# Patient Record
Sex: FEMALE | Race: WHITE | NOT HISPANIC OR LATINO | Employment: OTHER | URBAN - METROPOLITAN AREA
[De-identification: names, ages, dates, MRNs, and addresses within clinical notes are randomized per-mention and may not be internally consistent; named-entity substitution may affect disease eponyms.]

---

## 2023-10-24 ENCOUNTER — SPECIALTY PHARMACY (OUTPATIENT)
Dept: PHARMACY | Facility: CLINIC | Age: 63
End: 2023-10-24

## 2023-10-24 ENCOUNTER — PHARMACY VISIT (OUTPATIENT)
Dept: PHARMACY | Facility: CLINIC | Age: 63
End: 2023-10-24
Payer: MEDICARE

## 2023-10-24 PROCEDURE — RXMED WILLOW AMBULATORY MEDICATION CHARGE

## 2023-11-20 ENCOUNTER — TELEPHONE (OUTPATIENT)
Dept: RHEUMATOLOGY | Facility: CLINIC | Age: 63
End: 2023-11-20
Payer: MEDICARE

## 2023-11-20 DIAGNOSIS — M06.9 RHEUMATOID ARTHRITIS, INVOLVING UNSPECIFIED SITE, UNSPECIFIED WHETHER RHEUMATOID FACTOR PRESENT (MULTI): Primary | ICD-10-CM

## 2023-11-21 ENCOUNTER — SPECIALTY PHARMACY (OUTPATIENT)
Dept: PHARMACY | Facility: CLINIC | Age: 63
End: 2023-11-21

## 2023-11-21 ENCOUNTER — PHARMACY VISIT (OUTPATIENT)
Dept: PHARMACY | Facility: CLINIC | Age: 63
End: 2023-11-21
Payer: MEDICARE

## 2023-11-21 PROCEDURE — RXMED WILLOW AMBULATORY MEDICATION CHARGE

## 2023-11-22 ENCOUNTER — LAB (OUTPATIENT)
Dept: LAB | Facility: LAB | Age: 63
End: 2023-11-22
Payer: MEDICARE

## 2023-11-22 DIAGNOSIS — M06.9 RHEUMATOID ARTHRITIS, INVOLVING UNSPECIFIED SITE, UNSPECIFIED WHETHER RHEUMATOID FACTOR PRESENT (MULTI): ICD-10-CM

## 2023-11-22 LAB
ALBUMIN SERPL BCP-MCNC: 4 G/DL (ref 3.4–5)
ALP SERPL-CCNC: 81 U/L (ref 33–136)
ALT SERPL W P-5'-P-CCNC: 31 U/L (ref 7–45)
ANION GAP SERPL CALC-SCNC: 13 MMOL/L (ref 10–20)
AST SERPL W P-5'-P-CCNC: 66 U/L (ref 9–39)
BILIRUB SERPL-MCNC: 0.3 MG/DL (ref 0–1.2)
BUN SERPL-MCNC: 13 MG/DL (ref 6–23)
CALCIUM SERPL-MCNC: 8.9 MG/DL (ref 8.6–10.3)
CHLORIDE SERPL-SCNC: 105 MMOL/L (ref 98–107)
CO2 SERPL-SCNC: 25 MMOL/L (ref 21–32)
CREAT SERPL-MCNC: 0.82 MG/DL (ref 0.5–1.05)
CRP SERPL-MCNC: 2.03 MG/DL
ERYTHROCYTE [DISTWIDTH] IN BLOOD BY AUTOMATED COUNT: 13.2 % (ref 11.5–14.5)
ERYTHROCYTE [SEDIMENTATION RATE] IN BLOOD BY WESTERGREN METHOD: 9 MM/H (ref 0–30)
GFR SERPL CREATININE-BSD FRML MDRD: 81 ML/MIN/1.73M*2
GLUCOSE SERPL-MCNC: 139 MG/DL (ref 74–99)
HCT VFR BLD AUTO: 40.7 % (ref 36–46)
HGB BLD-MCNC: 13.9 G/DL (ref 12–16)
MCH RBC QN AUTO: 32.2 PG (ref 26–34)
MCHC RBC AUTO-ENTMCNC: 34.2 G/DL (ref 32–36)
MCV RBC AUTO: 94 FL (ref 80–100)
NRBC BLD-RTO: 0 /100 WBCS (ref 0–0)
PLATELET # BLD AUTO: 361 X10*3/UL (ref 150–450)
POTASSIUM SERPL-SCNC: 4.3 MMOL/L (ref 3.5–5.3)
PROT SERPL-MCNC: 6.4 G/DL (ref 6.4–8.2)
RBC # BLD AUTO: 4.32 X10*6/UL (ref 4–5.2)
SODIUM SERPL-SCNC: 139 MMOL/L (ref 136–145)
WBC # BLD AUTO: 6.8 X10*3/UL (ref 4.4–11.3)

## 2023-11-22 PROCEDURE — 36415 COLL VENOUS BLD VENIPUNCTURE: CPT

## 2023-11-22 PROCEDURE — 80053 COMPREHEN METABOLIC PANEL: CPT

## 2023-11-22 PROCEDURE — 86140 C-REACTIVE PROTEIN: CPT

## 2023-11-22 PROCEDURE — 85652 RBC SED RATE AUTOMATED: CPT

## 2023-11-22 PROCEDURE — 85027 COMPLETE CBC AUTOMATED: CPT

## 2023-11-28 ENCOUNTER — TELEMEDICINE (OUTPATIENT)
Dept: RHEUMATOLOGY | Facility: CLINIC | Age: 63
End: 2023-11-28
Payer: MEDICARE

## 2023-11-28 DIAGNOSIS — Z79.899 ENCOUNTER FOR LONG-TERM (CURRENT) USE OF MEDICATIONS: ICD-10-CM

## 2023-11-28 DIAGNOSIS — M06.9 RHEUMATOID ARTHRITIS, INVOLVING UNSPECIFIED SITE, UNSPECIFIED WHETHER RHEUMATOID FACTOR PRESENT (MULTI): Primary | ICD-10-CM

## 2023-11-28 PROCEDURE — 99212 OFFICE O/P EST SF 10 MIN: CPT | Performed by: INTERNAL MEDICINE

## 2023-11-28 RX ORDER — GLIPIZIDE 2.5 MG/1
2.5 TABLET, EXTENDED RELEASE ORAL
COMMUNITY
Start: 2023-08-17

## 2023-11-28 RX ORDER — METFORMIN HYDROCHLORIDE 1000 MG/1
1 TABLET ORAL
COMMUNITY
Start: 2023-08-17

## 2023-11-28 ASSESSMENT — ENCOUNTER SYMPTOMS: SHORTNESS OF BREATH: 0

## 2023-11-28 NOTE — PROGRESS NOTES
"Subjective   Patient ID: Stella Spears is a 63 y.o. female who presents for Follow-up (Phone visit~ 6 month follow up lower back pain /Patient is just getting over the FLU ).  HPI  Patient with rheumatoid arthritis positive CCP.  History of MRSA right knee.  Has been on Orencia, Enbrel, Humira, methotrexate, leflunomide.    At her last visit on 5/17/2023 we had her continue with Xeljanz that she had been getting through patient assistance.  She does have a history of fatty liver disease and is a non-smoker.  Was having bilateral knee pain.    Patient unable to come in today because of flulike symptoms.  She was having diarrhea and vomiting.  It is doing little bit better today.    Overall she feels that her symptoms are pretty stable.  She says she can \"to survive with the pain\".  Her knees will hurt her here and there.    Denies any other breathing problems or anything new with her health.    Review of Systems   Constitutional:         Flu like symptos    Respiratory:  Negative for shortness of breath.    Cardiovascular:  Negative for chest pain.   Gastrointestinal:         Just got over diarrhea and nausea   Musculoskeletal:         Per HPI       Objective   Physical Exam  Unable to physically evaluate  Assessment/Plan        Rheumatoid arthritis positive CCP with history of MRSA right knee.  Previously has been on Orencia, Enbrel, Humira, methotrexate, leflunomide   -The on Xeljanz through patient assistance and has had improvement of her symptoms.  She does have elevation again of her transaminases.  Reviewed her recent blood work.  She does have elevation of her CRP which could be due to her recent infection.  Have her continue with her current regimen.    She will come back in 6 months or as needed.  "

## 2023-12-07 DIAGNOSIS — M06.9 RHEUMATOID ARTHRITIS, INVOLVING UNSPECIFIED SITE, UNSPECIFIED WHETHER RHEUMATOID FACTOR PRESENT (MULTI): Primary | ICD-10-CM

## 2023-12-08 RX ORDER — TOFACITINIB 11 MG/1
11 TABLET, FILM COATED, EXTENDED RELEASE ORAL DAILY
Qty: 30 TABLET | Refills: 5 | Status: SHIPPED | OUTPATIENT
Start: 2023-12-08 | End: 2024-05-17 | Stop reason: SDUPTHER

## 2023-12-18 PROCEDURE — RXMED WILLOW AMBULATORY MEDICATION CHARGE

## 2023-12-20 ENCOUNTER — PHARMACY VISIT (OUTPATIENT)
Dept: PHARMACY | Facility: CLINIC | Age: 63
End: 2023-12-20
Payer: MEDICARE

## 2024-01-18 ENCOUNTER — SPECIALTY PHARMACY (OUTPATIENT)
Dept: PHARMACY | Facility: CLINIC | Age: 64
End: 2024-01-18

## 2024-01-18 PROCEDURE — RXMED WILLOW AMBULATORY MEDICATION CHARGE

## 2024-01-19 ENCOUNTER — PHARMACY VISIT (OUTPATIENT)
Dept: PHARMACY | Facility: CLINIC | Age: 64
End: 2024-01-19
Payer: MEDICARE

## 2024-02-14 ENCOUNTER — SPECIALTY PHARMACY (OUTPATIENT)
Dept: PHARMACY | Facility: CLINIC | Age: 64
End: 2024-02-14

## 2024-02-14 PROCEDURE — RXMED WILLOW AMBULATORY MEDICATION CHARGE

## 2024-02-16 ENCOUNTER — PHARMACY VISIT (OUTPATIENT)
Dept: PHARMACY | Facility: CLINIC | Age: 64
End: 2024-02-16
Payer: MEDICARE

## 2024-03-13 ENCOUNTER — SPECIALTY PHARMACY (OUTPATIENT)
Dept: PHARMACY | Facility: CLINIC | Age: 64
End: 2024-03-13

## 2024-03-13 PROCEDURE — RXMED WILLOW AMBULATORY MEDICATION CHARGE

## 2024-03-15 ENCOUNTER — PHARMACY VISIT (OUTPATIENT)
Dept: PHARMACY | Facility: CLINIC | Age: 64
End: 2024-03-15
Payer: MEDICARE

## 2024-04-09 ENCOUNTER — SPECIALTY PHARMACY (OUTPATIENT)
Dept: PHARMACY | Facility: CLINIC | Age: 64
End: 2024-04-09

## 2024-04-09 PROCEDURE — RXMED WILLOW AMBULATORY MEDICATION CHARGE

## 2024-04-10 ENCOUNTER — PHARMACY VISIT (OUTPATIENT)
Dept: PHARMACY | Facility: CLINIC | Age: 64
End: 2024-04-10
Payer: MEDICARE

## 2024-04-23 ENCOUNTER — TELEMEDICINE CLINICAL SUPPORT (OUTPATIENT)
Dept: PHARMACY | Facility: HOSPITAL | Age: 64
End: 2024-04-23

## 2024-04-23 NOTE — PROGRESS NOTES
[facility] Clinical Note    Stella Spears is a 64 y.o. female, who is on the specialty pharmacy service for management of: Rheumatology Core with status of: (Enrolled)     Stella was contacted on 4/23/2024 at 9:42 AM for a virtual pharmacy visit with encounter number [id number] from the [id number]  [address]  [facility] [address]  Dept: [phone]  Dept Fax: [phone]  Loc: [phone]    Stella was offered a Telemedicine Video visit or Telephone visit.  Stella consented to a telephone visit, which was performed.    Stella is taking: Xeljanz XR.   The most recent encounter visit with the referring prescriber Daja Kirk on 11/28/23 was reviewed.  Pharmacy will continue to collaborate in the care of this patient with the referring prescriber Daja Kirk.    General Assessment       Impression/Plan  IMPRESSION/PLAN:  Is patient high risk (potential patients:  pregnancy, geriatric, pediatric)?  No  Is laboratory follow-up needed? No   Is a clinical intervention needed? No  Next reassessment date?  Approx every 6 months  Additional comments: Current rx eligible through 5/22/24. Pt has a follow up scheduled for 5/29/24 and will need a new Xeljanz rx sent because will have 0 refills left.     Refer to the encounter summary report for documentation details about patient counseling and education.      Medication Adherence  The importance of adherence was discussed with the patient and they were advised to take the medication as prescribed by their provider. Stella was encouraged to call her physician's office if they have a question regarding a missed dose.     QOL/Patient Satisfaction  Rate your quality of life on scale of 1-10: 7  Rate your satisfaction with  Specialty Pharmacy on scale of 1-10: 10 - Completely satisfied      Patient advised to contact the pharmacy if there  are any changes to her medication list, including prescriptions, OTC medications, herbal products, or supplements. Patient was advised of [facility]’s dispensing process, refill timeline, contact information ([phone]), and patient management follow up. Patient confirmed understanding of education conducted during assessment. All patient questions and concerns were addressed to the best of my ability. Patient was encouraged to contact the specialty pharmacy with any questions or concerns.    Confirmed follow-up outreaches are properly scheduled. Reviewed goals of therapy in the program targets.    Dutch Myers, PharmD

## 2024-05-03 ENCOUNTER — SPECIALTY PHARMACY (OUTPATIENT)
Dept: PHARMACY | Facility: CLINIC | Age: 64
End: 2024-05-03

## 2024-05-03 ENCOUNTER — PHARMACY VISIT (OUTPATIENT)
Dept: PHARMACY | Facility: CLINIC | Age: 64
End: 2024-05-03
Payer: MEDICARE

## 2024-05-03 PROCEDURE — RXMED WILLOW AMBULATORY MEDICATION CHARGE

## 2024-05-17 ENCOUNTER — SPECIALTY PHARMACY (OUTPATIENT)
Dept: PHARMACY | Facility: CLINIC | Age: 64
End: 2024-05-17

## 2024-05-17 DIAGNOSIS — M06.9 RHEUMATOID ARTHRITIS, INVOLVING UNSPECIFIED SITE, UNSPECIFIED WHETHER RHEUMATOID FACTOR PRESENT (MULTI): ICD-10-CM

## 2024-05-17 RX ORDER — TOFACITINIB 11 MG/1
11 TABLET, FILM COATED, EXTENDED RELEASE ORAL DAILY
Qty: 30 TABLET | Refills: 0 | Status: SHIPPED | OUTPATIENT
Start: 2024-05-17 | End: 2024-06-27

## 2024-05-21 ENCOUNTER — LAB (OUTPATIENT)
Dept: LAB | Facility: LAB | Age: 64
End: 2024-05-21
Payer: MEDICARE

## 2024-05-21 DIAGNOSIS — M06.9 RHEUMATOID ARTHRITIS, INVOLVING UNSPECIFIED SITE, UNSPECIFIED WHETHER RHEUMATOID FACTOR PRESENT (MULTI): ICD-10-CM

## 2024-05-21 LAB
ALBUMIN SERPL BCP-MCNC: 4.2 G/DL (ref 3.4–5)
ALP SERPL-CCNC: 84 U/L (ref 33–136)
ALT SERPL W P-5'-P-CCNC: 15 U/L (ref 7–45)
ANION GAP SERPL CALC-SCNC: 11 MMOL/L (ref 10–20)
AST SERPL W P-5'-P-CCNC: 50 U/L (ref 9–39)
BILIRUB SERPL-MCNC: 0.3 MG/DL (ref 0–1.2)
BUN SERPL-MCNC: 14 MG/DL (ref 6–23)
CALCIUM SERPL-MCNC: 9 MG/DL (ref 8.6–10.3)
CHLORIDE SERPL-SCNC: 106 MMOL/L (ref 98–107)
CO2 SERPL-SCNC: 24 MMOL/L (ref 21–32)
CREAT SERPL-MCNC: 0.86 MG/DL (ref 0.5–1.05)
CRP SERPL-MCNC: 1.42 MG/DL
EGFRCR SERPLBLD CKD-EPI 2021: 76 ML/MIN/1.73M*2
ERYTHROCYTE [DISTWIDTH] IN BLOOD BY AUTOMATED COUNT: 13.2 % (ref 11.5–14.5)
ERYTHROCYTE [SEDIMENTATION RATE] IN BLOOD BY WESTERGREN METHOD: 22 MM/H (ref 0–30)
GLUCOSE SERPL-MCNC: 116 MG/DL (ref 74–99)
HCT VFR BLD AUTO: 42.2 % (ref 36–46)
HGB BLD-MCNC: 13.8 G/DL (ref 12–16)
MCH RBC QN AUTO: 30.5 PG (ref 26–34)
MCHC RBC AUTO-ENTMCNC: 32.7 G/DL (ref 32–36)
MCV RBC AUTO: 93 FL (ref 80–100)
NRBC BLD-RTO: 0 /100 WBCS (ref 0–0)
PLATELET # BLD AUTO: 315 X10*3/UL (ref 150–450)
POTASSIUM SERPL-SCNC: 4.2 MMOL/L (ref 3.5–5.3)
PROT SERPL-MCNC: 6.6 G/DL (ref 6.4–8.2)
RBC # BLD AUTO: 4.52 X10*6/UL (ref 4–5.2)
SODIUM SERPL-SCNC: 137 MMOL/L (ref 136–145)
WBC # BLD AUTO: 6 X10*3/UL (ref 4.4–11.3)

## 2024-05-21 PROCEDURE — 86481 TB AG RESPONSE T-CELL SUSP: CPT

## 2024-05-21 PROCEDURE — 36415 COLL VENOUS BLD VENIPUNCTURE: CPT

## 2024-05-21 PROCEDURE — 86140 C-REACTIVE PROTEIN: CPT

## 2024-05-21 PROCEDURE — 85652 RBC SED RATE AUTOMATED: CPT

## 2024-05-21 PROCEDURE — 80053 COMPREHEN METABOLIC PANEL: CPT

## 2024-05-21 PROCEDURE — 85027 COMPLETE CBC AUTOMATED: CPT

## 2024-05-23 LAB
NIL(NEG) CONTROL SPOT COUNT: NORMAL
PANEL A SPOT COUNT: 0
PANEL B SPOT COUNT: 0
POS CONTROL SPOT COUNT: NORMAL
T-SPOT. TB INTERPRETATION: NEGATIVE

## 2024-05-27 PROCEDURE — RXMED WILLOW AMBULATORY MEDICATION CHARGE

## 2024-05-28 ENCOUNTER — PHARMACY VISIT (OUTPATIENT)
Dept: PHARMACY | Facility: CLINIC | Age: 64
End: 2024-05-28
Payer: MEDICARE

## 2024-05-29 ENCOUNTER — OFFICE VISIT (OUTPATIENT)
Dept: RHEUMATOLOGY | Facility: CLINIC | Age: 64
End: 2024-05-29
Payer: MEDICARE

## 2024-05-29 VITALS
WEIGHT: 191 LBS | HEIGHT: 67 IN | BODY MASS INDEX: 29.98 KG/M2 | HEART RATE: 62 BPM | SYSTOLIC BLOOD PRESSURE: 138 MMHG | DIASTOLIC BLOOD PRESSURE: 82 MMHG

## 2024-05-29 DIAGNOSIS — M06.9 RHEUMATOID ARTHRITIS, INVOLVING UNSPECIFIED SITE, UNSPECIFIED WHETHER RHEUMATOID FACTOR PRESENT (MULTI): Primary | ICD-10-CM

## 2024-05-29 DIAGNOSIS — Z79.899 ENCOUNTER FOR LONG-TERM (CURRENT) USE OF MEDICATIONS: ICD-10-CM

## 2024-05-29 PROCEDURE — 99213 OFFICE O/P EST LOW 20 MIN: CPT | Performed by: INTERNAL MEDICINE

## 2024-05-29 RX ORDER — ROSUVASTATIN CALCIUM 20 MG/1
20 TABLET, COATED ORAL
COMMUNITY
Start: 2024-01-18

## 2024-05-29 ASSESSMENT — ENCOUNTER SYMPTOMS
SHORTNESS OF BREATH: 0
ABDOMINAL PAIN: 0

## 2024-05-29 NOTE — PROGRESS NOTES
Subjective   Patient ID: Stella Spears is a 64 y.o. female who presents for Follow-up (6 month follow up ).  HPI  Patient with rheumatoid arthritis positive CCP.  History of MRSA right knee.  Has been on Orencia, Enbrel, Humira, methotrexate, leflunomide.    Her last visit in November 2023 was a telehealth visit and I was unable to physically evaluate her.    Patient says she has not really seen much swelling but has had some stiffness.  If she has been sitting for a while it takes a few minutes for her to get going.  She also has been having back pain.  She did have an x-ray in December which showed some minimal scoliosis and degenerative changes.  She says she cannot walk for very long before her back will start hurting her.  She has lost some weight.  She does admit that sometimes when they go camping she just eats since.    She did have a tooth infection a couple of weeks ago and had to have it fixed.  No other infections.    Her blood sugars have been better.  Review of Systems   Constitutional:         Flu like symptos    Respiratory:  Negative for shortness of breath.    Cardiovascular:  Negative for chest pain.   Gastrointestinal:  Negative for abdominal pain.   Musculoskeletal:         Per HPI       Objective   Physical Exam  GEN: NAD A&O x3 appropriate affect  HENT: no enlarged glands or thyroid  EYES: no conjunctival redness, eyelids normal  LYMPH: no cervical lymphadenopathy  SKIN: no rashes  PULSES: +radials  TENDER POINTS: 0/18   MSK: No active synovitis in the joints the hands wrists elbows shoulders knees or ankles  Hypertrophic changes within the medial knees bilaterally more on the left than the right but no swelling   Assessment/Plan        Rheumatoid arthritis positive CCP with history of MRSA right knee.  Previously has been on Orencia, Enbrel, Humira, methotrexate, leflunomide   -Currently on Xeljanz.  She continues to have mild elevation of transaminases with known fatty liver disease.   She says she has been trying to lose some weight but also discussed for her to try to increase her physical activity and strengthening and core strength.  Reviewed her back x-ray.  Past we had wanted her to do physical therapy was deterred by co-pays.  Reviewed her recent blood work    She will come back in 6 months or as needed.

## 2024-06-11 ENCOUNTER — SPECIALTY PHARMACY (OUTPATIENT)
Dept: PHARMACY | Facility: CLINIC | Age: 64
End: 2024-06-11

## 2024-06-11 DIAGNOSIS — M06.9 RHEUMATOID ARTHRITIS, INVOLVING UNSPECIFIED SITE, UNSPECIFIED WHETHER RHEUMATOID FACTOR PRESENT (MULTI): ICD-10-CM

## 2024-06-12 RX ORDER — TOFACITINIB 11 MG/1
11 TABLET, FILM COATED, EXTENDED RELEASE ORAL DAILY
Qty: 30 TABLET | Refills: 5 | Status: SHIPPED | OUTPATIENT
Start: 2024-06-12 | End: 2024-12-09

## 2024-06-19 PROCEDURE — RXMED WILLOW AMBULATORY MEDICATION CHARGE

## 2024-06-21 ENCOUNTER — PHARMACY VISIT (OUTPATIENT)
Dept: PHARMACY | Facility: CLINIC | Age: 64
End: 2024-06-21
Payer: MEDICARE

## 2024-07-17 PROCEDURE — RXMED WILLOW AMBULATORY MEDICATION CHARGE

## 2024-07-18 ENCOUNTER — SPECIALTY PHARMACY (OUTPATIENT)
Dept: PHARMACY | Facility: CLINIC | Age: 64
End: 2024-07-18

## 2024-07-19 ENCOUNTER — PHARMACY VISIT (OUTPATIENT)
Dept: PHARMACY | Facility: CLINIC | Age: 64
End: 2024-07-19
Payer: MEDICARE

## 2024-08-15 PROCEDURE — RXMED WILLOW AMBULATORY MEDICATION CHARGE

## 2024-08-16 ENCOUNTER — PHARMACY VISIT (OUTPATIENT)
Dept: PHARMACY | Facility: CLINIC | Age: 64
End: 2024-08-16
Payer: MEDICARE

## 2024-08-16 ENCOUNTER — SPECIALTY PHARMACY (OUTPATIENT)
Dept: PHARMACY | Facility: CLINIC | Age: 64
End: 2024-08-16

## 2024-09-11 ENCOUNTER — SPECIALTY PHARMACY (OUTPATIENT)
Dept: PHARMACY | Facility: CLINIC | Age: 64
End: 2024-09-11

## 2024-09-11 PROCEDURE — RXMED WILLOW AMBULATORY MEDICATION CHARGE

## 2024-09-13 ENCOUNTER — PHARMACY VISIT (OUTPATIENT)
Dept: PHARMACY | Facility: CLINIC | Age: 64
End: 2024-09-13
Payer: MEDICARE

## 2024-10-12 ENCOUNTER — SPECIALTY PHARMACY (OUTPATIENT)
Dept: PHARMACY | Facility: CLINIC | Age: 64
End: 2024-10-12

## 2024-10-16 ENCOUNTER — SPECIALTY PHARMACY (OUTPATIENT)
Dept: PHARMACY | Facility: CLINIC | Age: 64
End: 2024-10-16

## 2024-10-16 PROCEDURE — RXMED WILLOW AMBULATORY MEDICATION CHARGE

## 2024-10-18 ENCOUNTER — PHARMACY VISIT (OUTPATIENT)
Dept: PHARMACY | Facility: CLINIC | Age: 64
End: 2024-10-18
Payer: MEDICARE

## 2024-10-22 ENCOUNTER — TELEMEDICINE CLINICAL SUPPORT (OUTPATIENT)
Dept: PHARMACY | Facility: HOSPITAL | Age: 64
End: 2024-10-22
Payer: MEDICARE

## 2024-10-22 NOTE — PROGRESS NOTES
"[facility] Clinical Note    Stella Spears is a 64 y.o. female, who is on the specialty pharmacy service for management of:  Rheumatology Core.    Stella Spears is taking: Xeljanz XR 11 mg po once a day.    Medication Receipt Date: 6/12/24  Medication Start Date (planned or actual): 6/12/24    Stella was contacted on 10/22/2024 at 11:01 AM for a virtual pharmacy visit with encounter number [id number] from:   [facility]  [address]  [facility] [address]  [facility] [address]  Dept: [phone]  Dept Fax: [phone]  Loc: [phone]    Stella was offered a Telemedicine Video visit or Telephone visit.  Stella consented to a Telemedicine visit, which was performed.    The most recent encounter visit with the referring prescriber Dr. Kirk on 5/29/24 was reviewed.  Pharmacy will continue to collaborate in the care of this patient with the referring prescriber Dr. Kirk.    General Assessment  Changes in home medications (OTCs, Supplements, Rxs), allergies, or health conditions: No      Specialty Medication in review: Xeljanz XR 11 mg po once a day    TOLERANCE: \"I've been on it so long, no side effects\"     EFFICACY: \"It's a miracle pill, I wouldn't be able to move without it\"     GOALS: Decrease RA disease activity - decrease joint pain, swelling, tenderness, and flares     COMPLIANCE / ADHERENCE:     Any barriers to adherence: No     Any barriers to self-administration (including physical and mental)? No        PATIENT MANAGEMENT:  Patient has no questions regarding medications or care. Patient voices no concerns with access to medications at this time.      Impression/Plan  IMPRESSION/PLAN:  Is patient high risk (potential patients:  pregnancy, geriatric, pediatric)?  No  Is laboratory follow-up needed? No  Is a clinical intervention needed? No  Next reassessment date? 4/22/25  Additional comments:   ALL labs past " month:   No visits with results within 1 Month(s) from this visit.   Latest known visit with results is:   Lab on 05/21/2024   Component Date Value Ref Range Status    T-SPOT. TB Interpretation 05/21/2024 Negative  Negative Final    Panel A Spot Count 05/21/2024 0   Final    Panel B Spot Count 05/21/2024 0   Final    NIL(NEG) Control Spot Count 05/21/2024 Passed   Final    POS Control Spot Count 05/21/2024 Passed   Final    WBC 05/21/2024 6.0  4.4 - 11.3 x10*3/uL Final    nRBC 05/21/2024 0.0  0.0 - 0.0 /100 WBCs Final    RBC 05/21/2024 4.52  4.00 - 5.20 x10*6/uL Final    Hemoglobin 05/21/2024 13.8  12.0 - 16.0 g/dL Final    Hematocrit 05/21/2024 42.2  36.0 - 46.0 % Final    MCV 05/21/2024 93  80 - 100 fL Final    MCH 05/21/2024 30.5  26.0 - 34.0 pg Final    MCHC 05/21/2024 32.7  32.0 - 36.0 g/dL Final    RDW 05/21/2024 13.2  11.5 - 14.5 % Final    Platelets 05/21/2024 315  150 - 450 x10*3/uL Final    Glucose 05/21/2024 116 (H)  74 - 99 mg/dL Final    Sodium 05/21/2024 137  136 - 145 mmol/L Final    Potassium 05/21/2024 4.2  3.5 - 5.3 mmol/L Final    Chloride 05/21/2024 106  98 - 107 mmol/L Final    Bicarbonate 05/21/2024 24  21 - 32 mmol/L Final    Anion Gap 05/21/2024 11  10 - 20 mmol/L Final    Urea Nitrogen 05/21/2024 14  6 - 23 mg/dL Final    Creatinine 05/21/2024 0.86  0.50 - 1.05 mg/dL Final    eGFR 05/21/2024 76  >60 mL/min/1.73m*2 Final    Calcium 05/21/2024 9.0  8.6 - 10.3 mg/dL Final    Albumin 05/21/2024 4.2  3.4 - 5.0 g/dL Final    Alkaline Phosphatase 05/21/2024 84  33 - 136 U/L Final    Total Protein 05/21/2024 6.6  6.4 - 8.2 g/dL Final    AST 05/21/2024 50 (H)  9 - 39 U/L Final    Bilirubin, Total 05/21/2024 0.3  0.0 - 1.2 mg/dL Final    ALT 05/21/2024 15  7 - 45 U/L Final    C-Reactive Protein 05/21/2024 1.42 (H)  <1.00 mg/dL Final    Sedimentation Rate 05/21/2024 22  0 - 30 mm/h Final       Refer to the encounter summary report for documentation details about patient counseling and education.   "    Medication Adherence    The importance of adherence was discussed with the patient and they were advised to take the medication as prescribed by their provider. Patient was encouraged to call their physician's office if they have a question regarding a missed dose.     QOL/Patient Satisfaction  Rate your quality of life on scale of 1-10: 10 - Completely satisfied (\"It's a miracle pill\")  Rate your satisfaction with  Specialty Pharmacy on scale of 1-10: 10 - Completely satisfied      Patient was advised to contact the pharmacy if there are any changes to their medication list, including prescriptions, OTC medications, herbal products, or supplements. Patient was advised of [facility]'s dispensing process, refill timeline, contact information ([phone]), and patient management follow up. Patient confirmed understanding of education conducted during assessment. All patient questions and concerns were addressed to the best of my ability. Patient was encouraged to contact the specialty pharmacy with any questions or concerns.    Confirmed follow-up outreaches are properly scheduled and reviewed goals of therapy with the patient.        ARIADNA BOB  "

## 2024-11-12 ENCOUNTER — SPECIALTY PHARMACY (OUTPATIENT)
Dept: PHARMACY | Facility: CLINIC | Age: 64
End: 2024-11-12

## 2024-11-12 PROCEDURE — RXMED WILLOW AMBULATORY MEDICATION CHARGE

## 2024-11-19 ENCOUNTER — SPECIALTY PHARMACY (OUTPATIENT)
Dept: PHARMACY | Facility: CLINIC | Age: 64
End: 2024-11-19

## 2024-11-20 ENCOUNTER — PHARMACY VISIT (OUTPATIENT)
Dept: PHARMACY | Facility: CLINIC | Age: 64
End: 2024-11-20
Payer: MEDICARE

## 2024-11-20 ENCOUNTER — TELEPHONE (OUTPATIENT)
Dept: RHEUMATOLOGY | Facility: CLINIC | Age: 64
End: 2024-11-20
Payer: MEDICARE

## 2024-11-20 DIAGNOSIS — M06.9 RHEUMATOID ARTHRITIS, INVOLVING UNSPECIFIED SITE, UNSPECIFIED WHETHER RHEUMATOID FACTOR PRESENT (MULTI): Primary | ICD-10-CM

## 2024-11-20 DIAGNOSIS — Z79.899 ENCOUNTER FOR LONG-TERM (CURRENT) USE OF MEDICATIONS: ICD-10-CM

## 2024-11-21 ENCOUNTER — LAB (OUTPATIENT)
Dept: LAB | Facility: LAB | Age: 64
End: 2024-11-21
Payer: MEDICARE

## 2024-11-21 DIAGNOSIS — Z79.899 ENCOUNTER FOR LONG-TERM (CURRENT) USE OF MEDICATIONS: ICD-10-CM

## 2024-11-21 DIAGNOSIS — M06.9 RHEUMATOID ARTHRITIS, INVOLVING UNSPECIFIED SITE, UNSPECIFIED WHETHER RHEUMATOID FACTOR PRESENT (MULTI): ICD-10-CM

## 2024-11-21 LAB
ALBUMIN SERPL BCP-MCNC: 4.1 G/DL (ref 3.4–5)
ALP SERPL-CCNC: 77 U/L (ref 33–136)
ALT SERPL W P-5'-P-CCNC: 36 U/L (ref 7–45)
ANION GAP SERPL CALC-SCNC: 11 MMOL/L (ref 10–20)
AST SERPL W P-5'-P-CCNC: 79 U/L (ref 9–39)
BILIRUB SERPL-MCNC: 0.5 MG/DL (ref 0–1.2)
BUN SERPL-MCNC: 12 MG/DL (ref 6–23)
CALCIUM SERPL-MCNC: 8.9 MG/DL (ref 8.6–10.3)
CHLORIDE SERPL-SCNC: 105 MMOL/L (ref 98–107)
CO2 SERPL-SCNC: 27 MMOL/L (ref 21–32)
CREAT SERPL-MCNC: 0.93 MG/DL (ref 0.5–1.05)
CRP SERPL-MCNC: 0.88 MG/DL
EGFRCR SERPLBLD CKD-EPI 2021: 69 ML/MIN/1.73M*2
ERYTHROCYTE [DISTWIDTH] IN BLOOD BY AUTOMATED COUNT: 13.2 % (ref 11.5–14.5)
ERYTHROCYTE [SEDIMENTATION RATE] IN BLOOD BY WESTERGREN METHOD: 3 MM/H (ref 0–30)
GLUCOSE SERPL-MCNC: 120 MG/DL (ref 74–99)
HCT VFR BLD AUTO: 40.5 % (ref 36–46)
HGB BLD-MCNC: 13.6 G/DL (ref 12–16)
MCH RBC QN AUTO: 31.1 PG (ref 26–34)
MCHC RBC AUTO-ENTMCNC: 33.6 G/DL (ref 32–36)
MCV RBC AUTO: 93 FL (ref 80–100)
NRBC BLD-RTO: 0 /100 WBCS (ref 0–0)
PLATELET # BLD AUTO: 297 X10*3/UL (ref 150–450)
POTASSIUM SERPL-SCNC: 3.9 MMOL/L (ref 3.5–5.3)
PROT SERPL-MCNC: 6.4 G/DL (ref 6.4–8.2)
RBC # BLD AUTO: 4.38 X10*6/UL (ref 4–5.2)
SODIUM SERPL-SCNC: 139 MMOL/L (ref 136–145)
WBC # BLD AUTO: 6.6 X10*3/UL (ref 4.4–11.3)

## 2024-11-21 PROCEDURE — 36415 COLL VENOUS BLD VENIPUNCTURE: CPT

## 2024-11-21 PROCEDURE — 86140 C-REACTIVE PROTEIN: CPT

## 2024-11-21 PROCEDURE — 85652 RBC SED RATE AUTOMATED: CPT

## 2024-11-21 PROCEDURE — 80053 COMPREHEN METABOLIC PANEL: CPT

## 2024-11-21 PROCEDURE — 85027 COMPLETE CBC AUTOMATED: CPT

## 2024-11-26 ENCOUNTER — APPOINTMENT (OUTPATIENT)
Dept: RHEUMATOLOGY | Facility: CLINIC | Age: 64
End: 2024-11-26
Payer: MEDICARE

## 2024-11-26 VITALS
WEIGHT: 192.8 LBS | BODY MASS INDEX: 30.26 KG/M2 | HEART RATE: 111 BPM | DIASTOLIC BLOOD PRESSURE: 83 MMHG | HEIGHT: 67 IN | OXYGEN SATURATION: 96 % | SYSTOLIC BLOOD PRESSURE: 116 MMHG

## 2024-11-26 DIAGNOSIS — M06.9 RHEUMATOID ARTHRITIS, INVOLVING UNSPECIFIED SITE, UNSPECIFIED WHETHER RHEUMATOID FACTOR PRESENT (MULTI): Primary | ICD-10-CM

## 2024-11-26 DIAGNOSIS — Z79.899 ENCOUNTER FOR LONG-TERM (CURRENT) USE OF MEDICATIONS: ICD-10-CM

## 2024-11-26 PROCEDURE — G2211 COMPLEX E/M VISIT ADD ON: HCPCS | Performed by: INTERNAL MEDICINE

## 2024-11-26 PROCEDURE — 99213 OFFICE O/P EST LOW 20 MIN: CPT | Performed by: INTERNAL MEDICINE

## 2024-11-26 PROCEDURE — 3008F BODY MASS INDEX DOCD: CPT | Performed by: INTERNAL MEDICINE

## 2024-11-26 ASSESSMENT — ENCOUNTER SYMPTOMS
ABDOMINAL PAIN: 0
SHORTNESS OF BREATH: 0

## 2024-11-26 NOTE — PROGRESS NOTES
Subjective   Patient ID: Stella Spears is a 64 y.o. female who presents for Follow-up (6 month follow up ).  HPI  Patient with rheumatoid arthritis positive CCP.  History of MRSA right knee.  Has been on Orencia, Enbrel, Humira, methotrexate, leflunomide.    Patient was last seen in May and at that time we had her continue with Xeljanz.  She says she does not have any pain or flares with her Xeljanz therapy.  She says she does not really check her blood sugars.  She had been on Emmanuel until about a month ago but had to discontinue because of GI side effects.  She was having a lot of acid reflux symptoms and even though she is discontinued she still has some symptoms.  Positive alcohol use.  Has had elevations of her transaminases.  She feels that she has been eating better for her diabetes and just eats 2 meals a day.    Review of Systems   Constitutional:         Flu like symptos    Respiratory:  Negative for shortness of breath.    Cardiovascular:  Negative for chest pain.   Gastrointestinal:  Negative for abdominal pain.   Musculoskeletal:         Per HPI       Objective   Physical Exam  GEN: NAD A&O x3 appropriate affect  HENT: no enlarged glands or thyroid  EYES: no conjunctival redness, eyelids normal  LYMPH: no cervical lymphadenopathy  SKIN: no rashes  PULSES: +radials  TENDER POINTS: 0/18   MSK: No active synovitis in the joints the hands wrists elbows shoulders knees or ankles  Hypertrophic changes within the medial knees bilaterally more on the left than the right but no swelling   Assessment/Plan        Rheumatoid arthritis positive CCP with history of MRSA right knee.  Previously has been on Orencia, Enbrel, Humira, methotrexate, leflunomide   -Currently on Xeljanz.  She continues to have mild elevation of transaminases with known fatty liver disease.  She had lost some weight with Mounjaro but had to discontinue due to GI side effects.  Still having some acid reflux and she is going to make an  appointment with gastroenterology closer to her.  Positive alcohol use and told her to also try to decrease this as well because of her liver.  Reviewed her recent blood work.    Will see her back again in 6 months or as needed  She will come back in 6 months or as needed.

## 2024-12-14 ENCOUNTER — SPECIALTY PHARMACY (OUTPATIENT)
Dept: PHARMACY | Facility: CLINIC | Age: 64
End: 2024-12-14

## 2024-12-14 DIAGNOSIS — M06.9 RHEUMATOID ARTHRITIS, INVOLVING UNSPECIFIED SITE, UNSPECIFIED WHETHER RHEUMATOID FACTOR PRESENT (MULTI): ICD-10-CM

## 2024-12-14 RX ORDER — TOFACITINIB 11 MG/1
11 TABLET, FILM COATED, EXTENDED RELEASE ORAL DAILY
Qty: 30 TABLET | Refills: 5 | Status: SHIPPED | OUTPATIENT
Start: 2024-12-14 | End: 2025-06-12

## 2024-12-16 PROCEDURE — RXMED WILLOW AMBULATORY MEDICATION CHARGE

## 2024-12-18 ENCOUNTER — SPECIALTY PHARMACY (OUTPATIENT)
Dept: PHARMACY | Facility: CLINIC | Age: 64
End: 2024-12-18

## 2024-12-19 ENCOUNTER — PHARMACY VISIT (OUTPATIENT)
Dept: PHARMACY | Facility: CLINIC | Age: 64
End: 2024-12-19
Payer: MEDICARE

## 2025-01-13 ENCOUNTER — SPECIALTY PHARMACY (OUTPATIENT)
Dept: PHARMACY | Facility: CLINIC | Age: 65
End: 2025-01-13

## 2025-01-20 PROCEDURE — RXMED WILLOW AMBULATORY MEDICATION CHARGE

## 2025-01-22 ENCOUNTER — PHARMACY VISIT (OUTPATIENT)
Dept: PHARMACY | Facility: CLINIC | Age: 65
End: 2025-01-22
Payer: MEDICARE

## 2025-02-14 ENCOUNTER — SPECIALTY PHARMACY (OUTPATIENT)
Dept: PHARMACY | Facility: CLINIC | Age: 65
End: 2025-02-14

## 2025-02-14 PROCEDURE — RXMED WILLOW AMBULATORY MEDICATION CHARGE

## 2025-02-15 ENCOUNTER — PHARMACY VISIT (OUTPATIENT)
Dept: PHARMACY | Facility: CLINIC | Age: 65
End: 2025-02-15
Payer: MEDICARE

## 2025-03-12 ENCOUNTER — SPECIALTY PHARMACY (OUTPATIENT)
Dept: PHARMACY | Facility: CLINIC | Age: 65
End: 2025-03-12

## 2025-03-12 PROCEDURE — RXMED WILLOW AMBULATORY MEDICATION CHARGE

## 2025-03-13 ENCOUNTER — PHARMACY VISIT (OUTPATIENT)
Dept: PHARMACY | Facility: CLINIC | Age: 65
End: 2025-03-13
Payer: MEDICARE

## 2025-04-09 PROCEDURE — RXMED WILLOW AMBULATORY MEDICATION CHARGE

## 2025-04-10 ENCOUNTER — SPECIALTY PHARMACY (OUTPATIENT)
Dept: PHARMACY | Facility: CLINIC | Age: 65
End: 2025-04-10

## 2025-04-11 ENCOUNTER — PHARMACY VISIT (OUTPATIENT)
Dept: PHARMACY | Facility: CLINIC | Age: 65
End: 2025-04-11
Payer: MEDICARE

## 2025-05-05 ENCOUNTER — TELEPHONE (OUTPATIENT)
Dept: RHEUMATOLOGY | Facility: CLINIC | Age: 65
End: 2025-05-05
Payer: MEDICARE

## 2025-05-13 ENCOUNTER — SPECIALTY PHARMACY (OUTPATIENT)
Dept: PHARMACY | Facility: CLINIC | Age: 65
End: 2025-05-13

## 2025-05-13 PROCEDURE — RXMED WILLOW AMBULATORY MEDICATION CHARGE

## 2025-05-15 ENCOUNTER — PHARMACY VISIT (OUTPATIENT)
Dept: PHARMACY | Facility: CLINIC | Age: 65
End: 2025-05-15
Payer: MEDICARE

## 2025-05-19 ENCOUNTER — TELEMEDICINE CLINICAL SUPPORT (OUTPATIENT)
Dept: PHARMACY | Facility: HOSPITAL | Age: 65
End: 2025-05-19
Payer: MEDICARE

## 2025-05-19 NOTE — PROGRESS NOTES
[facility] Clinical Note  Patient Reassessment     Introduction  Stella Spears is a 65 y.o. female who is on the specialty pharmacy service for management of: Rheumatology Core.      [facility] supplied medication: Xeljanz XR 11mg PO once daily For RA          Duration of therapy: Maintenance    The most recent encounter visit with the referring prescriber Dr. Kirk on 11/26/24 was reviewed.  Pharmacy will continue to collaborate in the care of this patient with the referring prescriber.    Discussion  Stella was contacted on 5/19/2025 at 1:37 PM for a pharmacy visit with encounter number [id number] from:   [facility]  [address]  [facility] [address]  Dept: [phone]  Dept Fax: [phone]  Loc: [phone]  Stella consented to a/an Telephone visit, which was performed.    Efficacy  Patient has developed new symptoms of condition: No  Patient/caregiver feels medication is affecting the disease state: Still working well for RA, no issues, complaints     Goals  Provided education on goals and possible outcomes of therapy:  Adherence with therapy  Optimize or maintain quality of life  Rheumatology: Remission or low disease activity  Reduction of inflammation, joint pain, swelling, and morning stiffness  Patient has documented target(s) for goals of therapy: Yes    Targets       Target Due Completed Completed By Outcome Source     Goal:  Prevent and reduce disease flares 11/19/2025 -- -- -- --         Goal: Remission or low disease activity 11/19/2025 -- -- -- --         Goal:  Prevent and reduce disease flares 11/19/2025 5/19/2025 Nilda Escobar PharmD On Track --         Goal: Remission or low disease activity 11/19/2025 5/19/2025 Nilda Escobar PharmD On Track --                Tolerance  Patient has experienced side effects from this medication: No  Changes to current therapy regimen: No    The  follow-up timeline was discussed. Every person responds to and reacts to therapy differently. Patient should be assessed for efficacy and tolerability in approximately: 6 months            Adherence  Patient Information  Informant: Self (Patient)  Demonstrates Understanding of Importance of Adherence: Yes  Does the patient have any barriers to self-administration (including physical and mental?): No  Medication Information  Medication: tofacitinib citrate (Xeljanz XR)  Patient Reported Missed Doses in the Last 4 Weeks: 1  Estimated Medication Adherence Level: %  Barriers to Adherence: No Problems identified   The importance of adherence was discussed and patient/caregiver was advised to take the medication as prescribed by their provider. Encouraged patient/caregiver to call physician's office or specialty pharmacy if they have a question regarding a missed dose.    General Assessment  Changes to home medications, OTCs or supplements: No  Current Medications[1]  Reported new allergies: No  Reported new medical conditions: No  Additional monitoring reviewed: Rheumatology- CBC-diff:   Lab Results   Component Value Date    WBC 6.6 11/21/2024    RBC 4.38 11/21/2024    HGB 13.6 11/21/2024    HCT 40.5 11/21/2024    MCV 93 11/21/2024    MCHC 33.6 11/21/2024     11/21/2024    RDW 13.2 11/21/2024    NEUTOPHILPCT 57.0 05/08/2023    IGPCT 0.6 05/08/2023    LYMPHOPCT 33.9 05/08/2023    MONOPCT 6.2 05/08/2023    EOSPCT 1.7 05/08/2023    BASOPCT 0.6 05/08/2023    NEUTROABS 3.65 05/08/2023    LYMPHSABS 2.17 05/08/2023    MONOSABS 0.40 05/08/2023    EOSABS 0.11 05/08/2023    BASOSABS 0.04 05/08/2023   , CMP:   Lab Results   Component Value Date    GLUCOSE 120 (H) 11/21/2024     11/21/2024    K 3.9 11/21/2024     11/21/2024    CO2 27 11/21/2024    ANIONGAP 11 11/21/2024    BUN 12 11/21/2024    CREATININE 0.93 11/21/2024    GFRF 78 05/08/2023    CALCIUM 8.9 11/21/2024    ALBUMIN 4.1 11/21/2024    ALKPHOS 77  11/21/2024    PROT 6.4 11/21/2024    AST 79 (H) 11/21/2024    BILITOT 0.5 11/21/2024    ALT 36 11/21/2024   , LFTs and bilirubin:   Lab Results   Component Value Date    ALT 36 11/21/2024    AST 79 (H) 11/21/2024    ALKPHOS 77 11/21/2024    BILITOT 0.5 11/21/2024   , CRP:   Lab Results   Component Value Date    CRP 0.88 11/21/2024   , ESR:    Lab Results   Component Value Date    SEDRATE 3 11/21/2024     Is laboratory follow up needed? No    Advised to contact the pharmacy if there are any changes to the patient's medication list, including prescriptions, OTC medications, herbal products, or supplements.    Impression/Plan  This patient has not been identified as high risk due to Lack of high risk qualifiers.  The following action was taken:N/A          QOL/Patient Satisfaction  Rate your quality of life on scale of 1-10: 8  Rate your satisfaction with  Specialty Pharmacy on scale of 1-10: 10 - Completely satisfied    Provided contact information ([phone]) for [facility] [place] [facility] and reviewed dispensing process, refill timeline and patient management follow up. Confirmed understanding of education conducted during assessment. All questions and concerns were addressed and patient/caregiver was encouraged to reach out for additional questions or concerns.    Based on the patient's diagnosis, medication list, progress towards goals, adherence, tolerance, and medication list, medication remains appropriate: Therapy remains appropriate (I attest)    Nilda Escobar, PharmD       [1]   Current Outpatient Medications   Medication Sig Dispense Refill    glipiZIDE XL (Glucotrol XL) 2.5 mg 24 hr tablet Take 1 tablet (2.5 mg) by mouth once daily.      metFORMIN (Glucophage) 1,000 mg tablet Take 1 tablet (1,000 mg) by mouth once daily with breakfast.      rosuvastatin (Crestor) 20 mg tablet Take 1 tablet (20 mg) by mouth once daily.      tofacitinib ER (Xeljanz XR) 11 mg tablet extended release 24 hr  Take 1 tablet (11 mg) by mouth once daily. 30 tablet 5     No current facility-administered medications for this visit.

## 2025-06-03 ENCOUNTER — APPOINTMENT (OUTPATIENT)
Dept: RHEUMATOLOGY | Facility: CLINIC | Age: 65
End: 2025-06-03
Payer: MEDICARE

## 2025-06-03 VITALS
DIASTOLIC BLOOD PRESSURE: 73 MMHG | SYSTOLIC BLOOD PRESSURE: 134 MMHG | OXYGEN SATURATION: 94 % | HEART RATE: 78 BPM | WEIGHT: 191.6 LBS | BODY MASS INDEX: 30.01 KG/M2

## 2025-06-03 DIAGNOSIS — M06.9 RHEUMATOID ARTHRITIS, INVOLVING UNSPECIFIED SITE, UNSPECIFIED WHETHER RHEUMATOID FACTOR PRESENT (MULTI): Primary | ICD-10-CM

## 2025-06-03 DIAGNOSIS — Z79.899 ENCOUNTER FOR LONG-TERM (CURRENT) USE OF MEDICATIONS: ICD-10-CM

## 2025-06-03 PROCEDURE — 99214 OFFICE O/P EST MOD 30 MIN: CPT | Performed by: INTERNAL MEDICINE

## 2025-06-03 PROCEDURE — 1036F TOBACCO NON-USER: CPT | Performed by: INTERNAL MEDICINE

## 2025-06-03 PROCEDURE — G2211 COMPLEX E/M VISIT ADD ON: HCPCS | Performed by: INTERNAL MEDICINE

## 2025-06-03 ASSESSMENT — PATIENT HEALTH QUESTIONNAIRE - PHQ9
1. LITTLE INTEREST OR PLEASURE IN DOING THINGS: NOT AT ALL
SUM OF ALL RESPONSES TO PHQ9 QUESTIONS 1 AND 2: 0
2. FEELING DOWN, DEPRESSED OR HOPELESS: NOT AT ALL

## 2025-06-03 ASSESSMENT — ENCOUNTER SYMPTOMS
OCCASIONAL FEELINGS OF UNSTEADINESS: 0
LOSS OF SENSATION IN FEET: 0
DEPRESSION: 0
ABDOMINAL PAIN: 0
SHORTNESS OF BREATH: 0

## 2025-06-03 NOTE — PROGRESS NOTES
Subjective   Patient ID: Stella Spears is a 65 y.o. female who presents for Follow-up.  HPI  Patient with rheumatoid arthritis positive CCP.  History of MRSA right knee.  Has been on Orencia, Enbrel, Humira, methotrexate, leflunomide.    Patient was last seen in November And at that time we had her continue on Xeljanz.  She continued to have elevations of transaminases with her known fatty liver disease.  She was on Mounjaro and losing weight but had to discontinue due to GI side effects.  She was still having GI issues and was going to see gastroenterology.  She does have lower back pain worse when walking.  Had seen her PCP and he diagnosed sacroiliac pain and recommended physical therapy.  In the interim she went to the emergency room in January pain going down her legs when climbing stairs.  Was radiating more down the right leg.  She did have a CT of the abdomen to rule out other etiology and was not found to have any acute abnormality and was noted to have fatty infiltration of the liver and right renal cyst.  Was finally seen by gastroenterology in late May for her elevated transaminase and also GI symptoms and her hemoglobin A1c at 7.2.  Has had further elevation of her transaminase on 4/18/2025 with AST of 131 and ALT of 49.  Recommended blood tests, ultrasound and EGD.  She has been feeling great.  She helps take care of her grandchildren.  Lower back pain that radiated down the right leg went away on its own.  She will be doing blood work recommended by gastroenterology later this week and will put off doing EGD since she has been busy taking care of her granddaughter.    She did have a cough but no other illnesses.  Has been trying to lose weight  Review of Systems   Respiratory:  Negative for shortness of breath.    Cardiovascular:  Negative for chest pain.   Gastrointestinal:  Negative for abdominal pain.   Musculoskeletal:         Per HPI       Objective   Physical Exam  GEN: NAD A&O x3  appropriate affect  HENT: no enlarged glands or thyroid  EYES: no conjunctival redness, eyelids normal  LYMPH: no cervical lymphadenopathy  SKIN: no rashes  PULSES: +radials  TENDER POINTS: 0/18   MSK: No active synovitis in the joints the hands wrists elbows shoulders knees or ankles  Hypertrophic changes within the medial knees bilaterally more on the left than the right but no swelling   Assessment/Plan        Rheumatoid arthritis positive CCP with history of MRSA right knee.  Previously has been on Orencia, Enbrel, Humira, methotrexate, leflunomide   -Currently on Xeljanz.  She continues to have mild elevation of transaminases with known fatty liver disease.  Recently was seen by gastroenterology and will be getting further blood testing ultrasound I did push off her EGD for another time as she helps care for her grandchildren.  Discussed that Xeljanz does go through the liver and may be also contributing.  She is reluctant to discontinue since it has been helping with her inflammatory arthritis symptoms and may have been on other Biologics and oral medications without much effect.  Discussed that her transaminase levels have been over 100 x 2 now.  Will review her blood work at the end of the week  Will see her back again in 6 months or as needed  She will come back in 6 months or as needed.

## 2025-06-10 DIAGNOSIS — M06.9 RHEUMATOID ARTHRITIS, INVOLVING UNSPECIFIED SITE, UNSPECIFIED WHETHER RHEUMATOID FACTOR PRESENT (MULTI): ICD-10-CM

## 2025-06-11 DIAGNOSIS — M06.9 RHEUMATOID ARTHRITIS, INVOLVING UNSPECIFIED SITE, UNSPECIFIED WHETHER RHEUMATOID FACTOR PRESENT (MULTI): ICD-10-CM

## 2025-06-11 RX ORDER — TOFACITINIB 11 MG/1
11 TABLET, FILM COATED, EXTENDED RELEASE ORAL DAILY
Qty: 30 TABLET | Refills: 5 | Status: SHIPPED | OUTPATIENT
Start: 2025-06-11 | End: 2025-12-08

## 2025-06-11 RX ORDER — TOFACITINIB 11 MG/1
11 TABLET, FILM COATED, EXTENDED RELEASE ORAL DAILY
Qty: 30 TABLET | Refills: 5 | Status: SHIPPED | OUTPATIENT
Start: 2025-06-11 | End: 2025-06-11 | Stop reason: SDUPTHER

## 2025-06-17 ENCOUNTER — SPECIALTY PHARMACY (OUTPATIENT)
Dept: PHARMACY | Facility: CLINIC | Age: 65
End: 2025-06-17

## 2025-06-17 PROCEDURE — RXMED WILLOW AMBULATORY MEDICATION CHARGE

## 2025-06-18 ENCOUNTER — PHARMACY VISIT (OUTPATIENT)
Dept: PHARMACY | Facility: CLINIC | Age: 65
End: 2025-06-18
Payer: MEDICARE

## 2025-07-12 ENCOUNTER — OFFICE VISIT (OUTPATIENT)
Dept: URGENT CARE | Facility: URGENT CARE | Age: 65
End: 2025-07-12
Payer: MEDICARE

## 2025-07-12 VITALS
SYSTOLIC BLOOD PRESSURE: 147 MMHG | BODY MASS INDEX: 28.19 KG/M2 | OXYGEN SATURATION: 96 % | DIASTOLIC BLOOD PRESSURE: 87 MMHG | WEIGHT: 180 LBS | TEMPERATURE: 99.8 F | HEART RATE: 91 BPM | RESPIRATION RATE: 20 BRPM

## 2025-07-12 DIAGNOSIS — F33.2 SEVERE EPISODE OF RECURRENT MAJOR DEPRESSIVE DISORDER, WITHOUT PSYCHOTIC FEATURES (MULTI): ICD-10-CM

## 2025-07-12 DIAGNOSIS — N30.00 ACUTE CYSTITIS WITHOUT HEMATURIA: Primary | ICD-10-CM

## 2025-07-12 DIAGNOSIS — R39.9 UTI SYMPTOMS: ICD-10-CM

## 2025-07-12 LAB
POC APPEARANCE, URINE: CLEAR
POC BILIRUBIN, URINE: ABNORMAL
POC BLOOD, URINE: ABNORMAL
POC COLOR, URINE: ABNORMAL
POC GLUCOSE, URINE: NEGATIVE MG/DL
POC KETONES, URINE: NEGATIVE MG/DL
POC LEUKOCYTES, URINE: ABNORMAL
POC NITRITE,URINE: POSITIVE
POC PH, URINE: 6 PH
POC PROTEIN, URINE: NEGATIVE MG/DL
POC SPECIFIC GRAVITY, URINE: <=1.005
POC UROBILINOGEN, URINE: 1 EU/DL

## 2025-07-12 RX ORDER — NITROFURANTOIN 25; 75 MG/1; MG/1
100 CAPSULE ORAL 2 TIMES DAILY
Qty: 14 CAPSULE | Refills: 0 | Status: SHIPPED | OUTPATIENT
Start: 2025-07-12 | End: 2025-07-19

## 2025-07-12 ASSESSMENT — ENCOUNTER SYMPTOMS
FEVER: 0
FATIGUE: 0
ABDOMINAL PAIN: 1
CHILLS: 0
FREQUENCY: 1
BACK PAIN: 1
FLANK PAIN: 0
DYSURIA: 1

## 2025-07-12 NOTE — PATIENT INSTRUCTIONS
Please drink plenty of fluids.   If you were given an antibiotic and do not experience any improvement in symptoms within 48 hours, please be re-evaluated here or at another healthcare facility.   Refrain from any sexual activity.   If you experience severe abdominal pain, fevers or chills, please proceed to nearest Emergency Room.   Otc Advil and/or Tylenol may be used for discomfort.     Follow-up with OB/GYN as discussed to address the lower abdominal pressure.

## 2025-07-12 NOTE — PROGRESS NOTES
Subjective   Patient ID: Stella Spears is a 65 y.o. female. They present today with a chief complaint of Back Pain and uti symptoms (Pt states low back pain, dysuria, urgency to go x 1 day. States she did take AZO).    History of Present Illness  65-year-old female presents with complaints of severe burning with urination, frequency and urgency that started today.  Also lower back pain.  And chills.  Denies fevers, vomiting.  Denies blood in urine.  Has taken Azo over-the-counter which helps a little bit.      History provided by:  Patient   used: No    Back Pain  Associated symptoms include abdominal pain and dysuria. Pertinent negatives include no fever.       Past Medical History  Allergies as of 07/12/2025    (No Known Allergies)       Prescriptions Prior to Admission[1]     Medical History[2]    Surgical History[3]     reports that she has never smoked. She has never been exposed to tobacco smoke. She has never used smokeless tobacco. She reports that she does not drink alcohol and does not use drugs.    Review of Systems  Review of Systems   Constitutional:  Negative for chills, fatigue and fever.   Gastrointestinal:  Positive for abdominal pain.   Genitourinary:  Positive for dysuria, frequency and urgency. Negative for flank pain.   Musculoskeletal:  Positive for back pain.                                  Objective    Vitals:    07/12/25 1732   BP: 147/87   BP Location: Left arm   Patient Position: Sitting   BP Cuff Size: Adult long   Pulse: 91   Resp: 20   Temp: 37.7 °C (99.8 °F)   TempSrc: Oral   SpO2: 96%   Weight: 81.6 kg (180 lb)     No LMP recorded (lmp unknown). Patient has had a hysterectomy.    Physical Exam  Vitals and nursing note reviewed.   Constitutional:       Appearance: Normal appearance.   Cardiovascular:      Rate and Rhythm: Normal rate and regular rhythm.   Pulmonary:      Effort: Pulmonary effort is normal.      Breath sounds: Normal breath sounds.    Abdominal:      Palpations: Abdomen is soft.      Tenderness: There is abdominal tenderness. There is no right CVA tenderness or left CVA tenderness.   Neurological:      General: No focal deficit present.      Mental Status: She is alert and oriented to person, place, and time.         Procedures    Point of Care Test & Imaging Results from this visit  Results for orders placed or performed in visit on 07/12/25   POCT UA Automated manually resulted   Result Value Ref Range    POC Color, Urine Orange (A) Straw, Yellow, Light-Yellow    POC Appearance, Urine Clear Clear    POC Glucose, Urine NEGATIVE NEGATIVE mg/dl    POC Bilirubin, Urine SMALL (1+) (A) NEGATIVE    POC Ketones, Urine NEGATIVE NEGATIVE mg/dl    POC Specific Gravity, Urine <=1.005 1.005 - 1.035    POC Blood, Urine LARGE (3+) (A) NEGATIVE    POC PH, Urine 6.0 No Reference Range Established PH    POC Protein, Urine NEGATIVE NEGATIVE mg/dl    POC Urobilinogen, Urine 1.0 0.2, 1.0 EU/DL    Poc Nitrite, Urine POSITIVE (A) NEGATIVE    POC Leukocytes, Urine LARGE (3+) (A) NEGATIVE      Imaging  No results found.    Cardiology, Vascular, and Other Imaging  No other imaging results found for the past 2 days      Diagnostic study results (if any) were reviewed by KATHERINE Watson.    Assessment/Plan   Allergies, medications, history, and pertinent labs/EKGs/Imaging reviewed by KATHERINE Watson.     Medical Decision Making  HPI: See Narrative Above   HISTORIAN: Patient   INDEPENDENT HISTORIAN:   RECORDS REVIEWED:    DIFFERENTIAL DX:  uti  2.  Pyelonephritis   3. Overactive bladder    LABS: ua in office--results skewed by use of azo   XRAY:  EKG:    IN CLINIC MEDICATIONS: Offered in clinic RX medications for patient they declined stated they wanted it sent through their insurance through their pharmacy.      CONSULTED WITH:    DIAGNOSIS: See urgent care course of treatment  SEE URGENT CARE COURSE OF TREATMENT AND DOCUMENTATION FOR RX MEDS  GIVEN.     PROCEDURES: SEE PROCEDURE NOTE    PLAN: Discussed that the results of the UA in clinic are skewed because of the use of Azo.  Will treat with Macrobid while we wait for culture to come back.  Push fluids.  Return if no symptom improvement within 48 hours.  ER if symptoms worsen or are severe.  Patient agreeable to plan. Reocmmended followup with OBGYN.       Patient and or family were counselled on labs, radiological studies, and all testing applicable for this visit as well as diagnosis. Patient was discharged home with stable afebrile non-toxic vital signs. They verbalized discharge instructions that were given to them BOTH written and verbally by myself.  They were given ample time to ask questions and have none at time of disposition.    Orders and Diagnoses  Diagnoses and all orders for this visit:  UTI symptoms  -     POCT UA Automated manually resulted      Medical Admin Record      Patient disposition: Home    Electronically signed by KATHERINE Watson  5:43 PM           [1] (Not in a hospital admission)  [2]   Past Medical History:  Diagnosis Date    Other conditions influencing health status     Arthritis    Personal history of other diseases of the musculoskeletal system and connective tissue 10/23/2014    Personal history of rheumatoid arthritis    Personal history of other mental and behavioral disorders 10/23/2014    History of depression   [3]   Past Surgical History:  Procedure Laterality Date    HYSTERECTOMY  10/23/2014    Hysterectomy    KNEE SURGERY  10/23/2014    Knee Surgery

## 2025-07-13 ENCOUNTER — TELEPHONE (OUTPATIENT)
Dept: URGENT CARE | Facility: URGENT CARE | Age: 65
End: 2025-07-13

## 2025-07-15 ENCOUNTER — TELEPHONE (OUTPATIENT)
Dept: URGENT CARE | Facility: URGENT CARE | Age: 65
End: 2025-07-15

## 2025-07-15 LAB — BACTERIA UR CULT: ABNORMAL

## 2025-07-15 PROCEDURE — RXMED WILLOW AMBULATORY MEDICATION CHARGE

## 2025-07-21 ENCOUNTER — SPECIALTY PHARMACY (OUTPATIENT)
Dept: PHARMACY | Facility: CLINIC | Age: 65
End: 2025-07-21

## 2025-07-23 ENCOUNTER — PHARMACY VISIT (OUTPATIENT)
Dept: PHARMACY | Facility: CLINIC | Age: 65
End: 2025-07-23
Payer: MEDICARE

## 2025-08-18 PROCEDURE — RXMED WILLOW AMBULATORY MEDICATION CHARGE

## 2025-08-19 ENCOUNTER — SPECIALTY PHARMACY (OUTPATIENT)
Dept: PHARMACY | Facility: CLINIC | Age: 65
End: 2025-08-19

## 2025-08-20 ENCOUNTER — PHARMACY VISIT (OUTPATIENT)
Dept: PHARMACY | Facility: CLINIC | Age: 65
End: 2025-08-20
Payer: MEDICARE

## 2025-12-02 ENCOUNTER — APPOINTMENT (OUTPATIENT)
Dept: RHEUMATOLOGY | Facility: CLINIC | Age: 65
End: 2025-12-02
Payer: MEDICARE